# Patient Record
Sex: FEMALE | Race: WHITE | NOT HISPANIC OR LATINO | URBAN - METROPOLITAN AREA
[De-identification: names, ages, dates, MRNs, and addresses within clinical notes are randomized per-mention and may not be internally consistent; named-entity substitution may affect disease eponyms.]

---

## 2020-02-19 ENCOUNTER — OFFICE (OUTPATIENT)
Dept: URBAN - METROPOLITAN AREA CLINIC 8 | Facility: CLINIC | Age: 40
End: 2020-02-19

## 2020-02-19 VITALS
HEIGHT: 63 IN | WEIGHT: 162 LBS | SYSTOLIC BLOOD PRESSURE: 124 MMHG | HEART RATE: 76 BPM | DIASTOLIC BLOOD PRESSURE: 62 MMHG

## 2020-02-19 DIAGNOSIS — K62.89 OTHER SPECIFIED DISEASES OF ANUS AND RECTUM: ICD-10-CM

## 2020-02-19 DIAGNOSIS — R13.10 DYSPHAGIA, UNSPECIFIED: ICD-10-CM

## 2020-02-19 DIAGNOSIS — K92.2 GASTROINTESTINAL HEMORRHAGE, UNSPECIFIED: ICD-10-CM

## 2020-02-19 DIAGNOSIS — K21.9 GASTRO-ESOPHAGEAL REFLUX DISEASE WITHOUT ESOPHAGITIS: ICD-10-CM

## 2020-02-19 DIAGNOSIS — K59.00 CONSTIPATION, UNSPECIFIED: ICD-10-CM

## 2020-02-19 PROCEDURE — 99204 OFFICE O/P NEW MOD 45 MIN: CPT | Performed by: INTERNAL MEDICINE

## 2020-02-19 RX ORDER — FAMOTIDINE 20 MG/1
TABLET, FILM COATED ORAL
Qty: 0 | Refills: 0 | Status: COMPLETED
End: 2020-02-19

## 2020-02-19 RX ORDER — FAMOTIDINE 40 MG/1
80 TABLET, FILM COATED ORAL
Qty: 60 | Refills: 11 | Status: COMPLETED
Start: 2020-02-19 | End: 2020-06-02

## 2020-02-19 RX ORDER — POLYETHYLENE GLYCOL 3350, SODIUM SULFATE, SODIUM CHLORIDE, POTASSIUM CHLORIDE, ASCORBIC ACID, SODIUM ASCORBATE 140-9-5.2G
KIT ORAL
Qty: 1 | Refills: 0 | Status: COMPLETED
Start: 2020-02-19 | End: 2020-03-20

## 2020-03-20 ENCOUNTER — OFFICE (OUTPATIENT)
Dept: URBAN - METROPOLITAN AREA PATHOLOGY 22 | Facility: PATHOLOGY | Age: 40
End: 2020-03-20

## 2020-03-20 ENCOUNTER — AMBULATORY SURGICAL CENTER (OUTPATIENT)
Dept: URBAN - METROPOLITAN AREA SURGERY 3 | Facility: SURGERY | Age: 40
End: 2020-03-20

## 2020-03-20 VITALS
DIASTOLIC BLOOD PRESSURE: 73 MMHG | RESPIRATION RATE: 18 BRPM | DIASTOLIC BLOOD PRESSURE: 72 MMHG | HEART RATE: 90 BPM | HEART RATE: 104 BPM | HEART RATE: 101 BPM | HEIGHT: 63 IN | SYSTOLIC BLOOD PRESSURE: 138 MMHG | HEART RATE: 104 BPM | SYSTOLIC BLOOD PRESSURE: 110 MMHG | RESPIRATION RATE: 16 BRPM | SYSTOLIC BLOOD PRESSURE: 138 MMHG | HEIGHT: 63 IN | SYSTOLIC BLOOD PRESSURE: 110 MMHG | RESPIRATION RATE: 18 BRPM | OXYGEN SATURATION: 96 % | SYSTOLIC BLOOD PRESSURE: 138 MMHG | DIASTOLIC BLOOD PRESSURE: 73 MMHG | SYSTOLIC BLOOD PRESSURE: 110 MMHG | TEMPERATURE: 98.5 F | DIASTOLIC BLOOD PRESSURE: 73 MMHG | HEART RATE: 101 BPM | SYSTOLIC BLOOD PRESSURE: 114 MMHG | DIASTOLIC BLOOD PRESSURE: 80 MMHG | DIASTOLIC BLOOD PRESSURE: 58 MMHG | SYSTOLIC BLOOD PRESSURE: 116 MMHG | SYSTOLIC BLOOD PRESSURE: 132 MMHG | OXYGEN SATURATION: 100 % | TEMPERATURE: 97.4 F | RESPIRATION RATE: 20 BRPM | TEMPERATURE: 97.4 F | SYSTOLIC BLOOD PRESSURE: 132 MMHG | SYSTOLIC BLOOD PRESSURE: 116 MMHG | OXYGEN SATURATION: 96 % | SYSTOLIC BLOOD PRESSURE: 114 MMHG | HEART RATE: 104 BPM | WEIGHT: 162 LBS | HEART RATE: 95 BPM | HEART RATE: 95 BPM | TEMPERATURE: 98.5 F | RESPIRATION RATE: 18 BRPM | HEART RATE: 90 BPM | OXYGEN SATURATION: 100 % | RESPIRATION RATE: 16 BRPM | HEIGHT: 63 IN | WEIGHT: 162 LBS | DIASTOLIC BLOOD PRESSURE: 80 MMHG | DIASTOLIC BLOOD PRESSURE: 58 MMHG | HEART RATE: 90 BPM | DIASTOLIC BLOOD PRESSURE: 58 MMHG | RESPIRATION RATE: 20 BRPM | DIASTOLIC BLOOD PRESSURE: 72 MMHG | OXYGEN SATURATION: 100 % | HEART RATE: 95 BPM | SYSTOLIC BLOOD PRESSURE: 116 MMHG | TEMPERATURE: 98.5 F | DIASTOLIC BLOOD PRESSURE: 72 MMHG | DIASTOLIC BLOOD PRESSURE: 80 MMHG | SYSTOLIC BLOOD PRESSURE: 114 MMHG | HEART RATE: 101 BPM | SYSTOLIC BLOOD PRESSURE: 132 MMHG | RESPIRATION RATE: 20 BRPM | OXYGEN SATURATION: 96 % | WEIGHT: 162 LBS | RESPIRATION RATE: 16 BRPM | TEMPERATURE: 97.4 F

## 2020-03-20 DIAGNOSIS — K31.89 OTHER DISEASES OF STOMACH AND DUODENUM: ICD-10-CM

## 2020-03-20 DIAGNOSIS — K21.9 GASTRO-ESOPHAGEAL REFLUX DISEASE WITHOUT ESOPHAGITIS: ICD-10-CM

## 2020-03-20 DIAGNOSIS — R13.10 DYSPHAGIA, UNSPECIFIED: ICD-10-CM

## 2020-03-20 DIAGNOSIS — K92.1 MELENA: ICD-10-CM

## 2020-03-20 PROCEDURE — 45378 DIAGNOSTIC COLONOSCOPY: CPT | Performed by: INTERNAL MEDICINE

## 2020-03-20 PROCEDURE — 43239 EGD BIOPSY SINGLE/MULTIPLE: CPT | Mod: 51 | Performed by: INTERNAL MEDICINE

## 2020-03-20 PROCEDURE — 88313 SPECIAL STAINS GROUP 2: CPT | Performed by: INTERNAL MEDICINE

## 2020-03-20 PROCEDURE — 88305 TISSUE EXAM BY PATHOLOGIST: CPT | Performed by: INTERNAL MEDICINE

## 2020-03-20 PROCEDURE — 88342 IMHCHEM/IMCYTCHM 1ST ANTB: CPT | Performed by: INTERNAL MEDICINE

## 2020-06-02 ENCOUNTER — OFFICE (OUTPATIENT)
Dept: URBAN - METROPOLITAN AREA CLINIC 19 | Facility: CLINIC | Age: 40
End: 2020-06-02

## 2020-06-02 VITALS
HEART RATE: 90 BPM | HEIGHT: 63 IN | SYSTOLIC BLOOD PRESSURE: 113 MMHG | WEIGHT: 168 LBS | DIASTOLIC BLOOD PRESSURE: 74 MMHG

## 2020-06-02 DIAGNOSIS — L29.0 PRURITUS ANI: ICD-10-CM

## 2020-06-02 DIAGNOSIS — K64.9 UNSPECIFIED HEMORRHOIDS: ICD-10-CM

## 2020-06-02 PROCEDURE — 99212 OFFICE O/P EST SF 10 MIN: CPT | Performed by: INTERNAL MEDICINE

## 2020-06-02 NOTE — SERVICEHPINOTES
Yogesh  is a   40  year old   female    kindly referred by Dr. Gonzalez, here to discuss hemorrhoidal banding.  She underwent a recent colonoscopy rectal bleeding which she describes as blood on tissue.  There were noticeable internal hemorrhoids.  Today she downplays the bleeding but she states the pruritus is her primary symptom.  It can occur any time of the day, unrelated to timing a bowel movement.  Denies any pain with passing stool (to suggest a fissure).She is currently responding to topical steroids I explained that this most certainly could be from her internal hemorrhoids prolapsing.  Hemorrhoid banding is a good in reasonable option. which also supports the possibility of the hemorrhoids.  I explained that I can do hemorrhoid banding even today, and suggested that we do a rectal exam today to identify the most prominent hemorrhoid and to ensure there is no other underlying cause for the itching such as candidiasis, etc. Unfortunately she states she is currently menstruating and  she would prefer to reschedule for the rectal examBR

## 2020-07-10 ENCOUNTER — OFFICE (OUTPATIENT)
Dept: URBAN - METROPOLITAN AREA CLINIC 19 | Facility: CLINIC | Age: 40
End: 2020-07-10
Payer: COMMERCIAL

## 2020-07-10 VITALS
WEIGHT: 170 LBS | SYSTOLIC BLOOD PRESSURE: 128 MMHG | HEART RATE: 97 BPM | DIASTOLIC BLOOD PRESSURE: 68 MMHG | HEIGHT: 63 IN

## 2020-07-10 DIAGNOSIS — K64.9 UNSPECIFIED HEMORRHOIDS: ICD-10-CM

## 2020-07-10 DIAGNOSIS — L29.0 PRURITUS ANI: ICD-10-CM

## 2020-07-10 PROCEDURE — 46600 DIAGNOSTIC ANOSCOPY SPX: CPT | Performed by: INTERNAL MEDICINE

## 2020-07-10 PROCEDURE — 99213 OFFICE O/P EST LOW 20 MIN: CPT | Mod: 25 | Performed by: INTERNAL MEDICINE

## 2020-07-10 RX ORDER — HYDROCORTISONE ACETATE PRAMOXINE HCL 2.5; 1 G/100G; G/100G
CREAM TOPICAL
Qty: 30 | Refills: 3 | Status: COMPLETED
Start: 2020-07-10 | End: 2022-03-14

## 2022-03-14 ENCOUNTER — OFFICE (OUTPATIENT)
Dept: URBAN - METROPOLITAN AREA CLINIC 8 | Facility: CLINIC | Age: 42
End: 2022-03-14

## 2022-03-14 VITALS
OXYGEN SATURATION: 99 % | WEIGHT: 177 LBS | DIASTOLIC BLOOD PRESSURE: 76 MMHG | HEART RATE: 83 BPM | HEIGHT: 63 IN | SYSTOLIC BLOOD PRESSURE: 126 MMHG

## 2022-03-14 DIAGNOSIS — K21.9 GASTRO-ESOPHAGEAL REFLUX DISEASE WITHOUT ESOPHAGITIS: ICD-10-CM

## 2022-03-14 PROCEDURE — 99213 OFFICE O/P EST LOW 20 MIN: CPT | Performed by: INTERNAL MEDICINE

## 2022-03-14 RX ORDER — OMEPRAZOLE 40 MG/1
40 CAPSULE, DELAYED RELEASE ORAL
Qty: 90 | Refills: 3 | Status: ACTIVE
Start: 2020-03-25

## 2022-03-14 NOTE — SERVICEHPINOTES
Ms. Zuñiga is a 42 year old female that presents today for follow up. She notes that her reflux is well controlled on Omeprazole daily. She notes that she may have breakthrough symptoms once every couple of months depend on eating an aggravating food like spicy food. She denies epigastric pain. She denies having dysphagia. She denies melena. No regular use of NSAIDs.
kamlesh whitlock MD Note:   Discussed alarm signs and symptoms with patient and she will call if these develop

## 2022-03-14 NOTE — SERVICENOTES
Pt seen and examined independently by Dr. Gonzalez who discussed patient and developed impression and plan with ARIE Doran

## 2023-03-20 ENCOUNTER — OFFICE (OUTPATIENT)
Dept: URBAN - METROPOLITAN AREA CLINIC 8 | Facility: CLINIC | Age: 43
End: 2023-03-20

## 2023-03-20 VITALS
DIASTOLIC BLOOD PRESSURE: 40 MMHG | OXYGEN SATURATION: 100 % | WEIGHT: 176 LBS | SYSTOLIC BLOOD PRESSURE: 127 MMHG | HEIGHT: 63 IN | HEART RATE: 74 BPM

## 2023-03-20 DIAGNOSIS — K21.9 GASTRO-ESOPHAGEAL REFLUX DISEASE WITHOUT ESOPHAGITIS: ICD-10-CM

## 2023-03-20 PROBLEM — K31.89 OTHER DISEASES OF STOMACH AND DUODENUM: Status: INACTIVE | Noted: 2020-03-20

## 2023-03-20 PROCEDURE — 99214 OFFICE O/P EST MOD 30 MIN: CPT | Performed by: INTERNAL MEDICINE

## 2023-03-20 RX ORDER — OMEPRAZOLE 40 MG/1
40 CAPSULE, DELAYED RELEASE ORAL
Qty: 90 | Refills: 3 | Status: ACTIVE
Start: 2020-03-25

## 2024-04-01 ENCOUNTER — OFFICE (OUTPATIENT)
Dept: URBAN - METROPOLITAN AREA CLINIC 9 | Facility: CLINIC | Age: 44
End: 2024-04-01

## 2024-04-01 VITALS
HEIGHT: 63 IN | DIASTOLIC BLOOD PRESSURE: 78 MMHG | HEART RATE: 87 BPM | WEIGHT: 175 LBS | OXYGEN SATURATION: 99 % | SYSTOLIC BLOOD PRESSURE: 127 MMHG

## 2024-04-01 DIAGNOSIS — K59.00 CONSTIPATION, UNSPECIFIED: ICD-10-CM

## 2024-04-01 DIAGNOSIS — K21.9 GASTRO-ESOPHAGEAL REFLUX DISEASE WITHOUT ESOPHAGITIS: ICD-10-CM

## 2024-04-01 PROCEDURE — 99214 OFFICE O/P EST MOD 30 MIN: CPT | Performed by: NURSE PRACTITIONER

## 2024-04-01 RX ORDER — OMEPRAZOLE 40 MG/1
40 CAPSULE, DELAYED RELEASE ORAL
Qty: 90 | Refills: 3 | Status: ACTIVE
Start: 2020-03-25

## 2024-04-01 NOTE — SERVICENOTES
Attending Attestation:  Patient seen and examined independently by me, Dr. Saji Gonzalez.  Prior records reviewed and case discussed with ARIE Doran after which impression and plan were developed.

## 2024-04-01 NOTE — SERVICEHPINOTES
Ms. Zuñiga presents today for follow up of GERD. She notes that her symptoms are well controlled on Omeprazole 40 mg daily. She notes that she rarely has to take tums as needed for breakthrough. Triggers include tomato based sauces and chocolate. She denies presence of dysphagia, nausea, vomiting, unexplained weight loss, melena, or epigastric pain. She notes that she has taken Ibuprofen for back pain. Since taking this medication she has added stool softeners to prevent constipation. She has a bowel movement daily. Stools are formed, soft and brown. No hematochezia. She sees Ortho this week for evaluation. 
kamlesh whitlock MD Note:    Here for follow-up of gastroesophageal reflux disease and endoscopically diagnose gastritis.  At the time of her diagnosis of gastritis, she was taking ibuprofen along with famotidine 20 mg b.i.d.  She reports she has recently increased her ibuprofen usage due to worsening back pain.  She is also taking alprazolam at bedtime to help her sleep due to back discomfort.  She reports some mild constipation which she has attributed to NSAID usage and has been taking a stool softener with good results.  No blood in her stools.  No melena, dysphagia or unintentional weight loss.  Reflux symptoms are well controlled on daily omeprazole.

## 2024-08-12 ENCOUNTER — OFFICE (OUTPATIENT)
Dept: URBAN - METROPOLITAN AREA CLINIC 11 | Facility: CLINIC | Age: 44
End: 2024-08-12
Payer: COMMERCIAL

## 2024-08-12 VITALS — HEIGHT: 63 IN

## 2024-08-12 DIAGNOSIS — K76.0 FATTY (CHANGE OF) LIVER, NOT ELSEWHERE CLASSIFIED: ICD-10-CM

## 2024-08-12 DIAGNOSIS — R74.01 ELEVATION OF LEVELS OF LIVER TRANSAMINASE LEVELS: ICD-10-CM

## 2024-08-12 PROCEDURE — 76981 USE PARENCHYMA: CPT | Performed by: INTERNAL MEDICINE

## 2025-06-12 ENCOUNTER — OFFICE (OUTPATIENT)
Dept: URBAN - METROPOLITAN AREA CLINIC 9 | Facility: CLINIC | Age: 45
End: 2025-06-12
Payer: COMMERCIAL

## 2025-06-12 VITALS
SYSTOLIC BLOOD PRESSURE: 125 MMHG | OXYGEN SATURATION: 95 % | DIASTOLIC BLOOD PRESSURE: 75 MMHG | WEIGHT: 174 LBS | HEIGHT: 63 IN | HEART RATE: 78 BPM

## 2025-06-12 DIAGNOSIS — K21.9 GASTRO-ESOPHAGEAL REFLUX DISEASE WITHOUT ESOPHAGITIS: ICD-10-CM

## 2025-06-12 DIAGNOSIS — R74.01 ELEVATION OF LEVELS OF LIVER TRANSAMINASE LEVELS: ICD-10-CM

## 2025-06-12 PROCEDURE — 99214 OFFICE O/P EST MOD 30 MIN: CPT | Performed by: INTERNAL MEDICINE
